# Patient Record
Sex: FEMALE | Race: WHITE | Employment: FULL TIME | URBAN - METROPOLITAN AREA
[De-identification: names, ages, dates, MRNs, and addresses within clinical notes are randomized per-mention and may not be internally consistent; named-entity substitution may affect disease eponyms.]

---

## 2020-07-20 ENCOUNTER — OFFICE VISIT (OUTPATIENT)
Dept: ORTHOPEDIC SURGERY | Age: 45
End: 2020-07-20
Payer: COMMERCIAL

## 2020-07-20 VITALS
DIASTOLIC BLOOD PRESSURE: 68 MMHG | HEART RATE: 58 BPM | BODY MASS INDEX: 20.89 KG/M2 | SYSTOLIC BLOOD PRESSURE: 104 MMHG | HEIGHT: 66 IN | WEIGHT: 130 LBS

## 2020-07-20 PROCEDURE — 99203 OFFICE O/P NEW LOW 30 MIN: CPT | Performed by: ORTHOPAEDIC SURGERY

## 2020-07-20 RX ORDER — NIFEDIPINE 10 MG/1
10 CAPSULE ORAL DAILY
COMMUNITY
Start: 2020-04-30

## 2020-07-20 NOTE — PROGRESS NOTES
[facility] ORTHOPEDICS AND SPORTS MEDICINE  [address] [facility] [address]  Dept: [phone]    Ambulatory Orthopedic Consult      CHIEF COMPLAINT:    Chief Complaint   Patient presents with    Foot Pain     LEFT FOOT/ANKLE PAIN        HISTORY OF PRESENT ILLNESS:      The patient is a 41 y.o. female who is being seen for consultation and evaluation of pain at the left lateral border of the hindfoot/midfoot greater than anterolateral ankle joint, which began in March 2020 atraumatically. The pain is described mainly with mechanical terms (dull/sharp/throbbing). The pain is worse with activity and better with rest. The patient reports a progressive course. The patient has tried:      [x]  rest/activity modification          []  NSAIDs      []  opiates      [x]  orthotics        [x]  change in shoes   [x]  home exercises  []  physical therapy      []  CAM boot     []  brace:    []  injection:       []  surgery:        REVIEW OF SYSTEMS:  Constitutional: Negative for fever. HENT: Negative for tinnitus. Eyes: Negative for pain. Respiratory: Negative for shortness of breath. Cardiovascular: Negative for chest pain. Gastrointestinal: Negative for abdominal pain. Genitourinary: Negative for dysuria. Skin: Negative for rash. Neurological: Negative for headaches. Hematological: Does not bruise/bleed easily. Musculoskeletal: See HPI for pertinent positives     Past Medical History:    She  has no past medical history on file. Past Surgical History:    She  has no past surgical history on file.      Current Medications:     Current Outpatient Medications:     NIFEdipine (PROCARDIA) 10 MG capsule, Take 10 mg by mouth daily, Disp: , Rfl:     metoprolol tartrate (LOPRESSOR) 25 MG tablet, Take 25 mg by mouth daily, Disp: , Rfl:     PARAGARD INTRAUTERINE COPPER IU, by Intrauterine route daily, Disp: , Rfl:      Allergies:    Amoxicillin    Family History:  family history is not on file. Social History:   Social History     Occupational History    Not on file   Tobacco Use    Smoking status: Never Smoker    Smokeless tobacco: Never Used   Substance and Sexual Activity    Alcohol use: Not on file    Drug use: Not on file    Sexual activity: Not on file     Occupation: [employer] (plays [employer]). She enjoys running (about 5 miles), and is a mother of 2 children. OBJECTIVE:  /68   Pulse 58   Ht 5' 6\" (1.676 m)   Wt 130 lb (59 kg)   BMI 20.98 kg/m²    Psych: alert and oriented to person, time, and place  Cardio:  well perfused extremities  Resp:  normal respiratory effort  Skin:  no cyanosis  Hem/lymph:  no lymphedema  Neuro:  sensation to light touch grossly intact throughout all nerve distributions in the foot   Musculoskeletal:    RLE:  Alignment:  Heel neutral cavus  Vascular: Toes warm and well perfused, compartments soft/compressible. No significant swelling of foot. Skin: Intact without rash/lesions/AV malformations. Strength: Able to fire/perform the following with appropriate strength:    [x]  Tib Ant:     [x]  Gastroc-Soleus:         [x]  Inversion:    [x]  Eversion:         [x]  FHL:     [x]  EHL:      Motion:  Normal for the following joints:    [x]  Ankle:      [x]  Subtalar:        [x]  1st MTP:      []  1st TMT:            Tenderness to Palpation:    Tenderness to palpation: None  -Gastroc equinus      LLE:  Alignment:  Heel neutral cavus  Vascular: Toes warm and well perfused, compartments soft/compressible. No significant swelling of foot. Skin: Intact without rash/lesions/AV malformations.   Strength: Able to fire/perform the following with appropriate strength:    [x]  Tib Ant:     [x]  Gastroc-Soleus:         [x]  Inversion:    []  Eversion: Slightly weak, mild pain       [x]  FHL:     [x]  EHL:      Motion:  Normal for the following joints:    [x]  Ankle:      [x]  Subtalar:        [x]  1st MTP:      []  1st TMT:            Tenderness to Palpation:    Tenderness to palpation: Lateral border of the foot and base of the fifth metatarsal greater than anterolateral ankle joint      RADIOLOGY:   7/20/2020 FINDINGS:  Three weightbearing views (AP, Mortise, and Lateral) of the left ankle and three weightbearing views (AP, Oblique, Lateral) of the left foot were obtained in the office today and reviewed, revealing no acute fracture, dislocation, or radioopaque foreign body/tumor. The ankle mortise is maintained with no widening of the clear spaces. Narrowed talocalcaneal angle. Meary's angle is apex dorsal.  Degenerative changes the talonavicular joint with dorsal osteophytes. IMPRESSION:  No acute fracture/dislocation. Pes Cavus. Degenerative changes at the talonavicular joint with dorsal osteophytes. Electronically signed by Reuben Burroughs MD    ASSESSMENT AND PLAN:  Cathern Blizzard was seen today for Foot Pain (LEFT FOOT/ANKLE PAIN )  The primary encounter diagnosis was Cavus deformity of foot, acquired. Diagnoses of Gastrocnemius equinus of left lower extremity and Peroneal tendinitis of left lower extremity were also pertinent to this visit. Body mass index is 20.98 kg/m². She has left lateral border foot pain secondary to a cavus foot with gastroc equinus and evidence of lateral border overload and some mild distal peroneal tendinitis; she also has some anterolateral ankle joint line pain. Notably, she has no relevant past medical history. I had a long discussion today with the patient about the likely diagnosis and its natural history, physical exam and imaging findings, as well as treatment options in detail. We discussed rest/activity modification, swelling control, NSAIDs/Acetaminophen/topical anesthetics, orthotics/shoewear modification, bracing/immobilization, injections, and physical therapy. The patient wishes to proceed with the recommendations as above.  Orders/referrals were placed as below at today's visit. I also provided information on an over the counter cavus style orthotic, including how to obtain it. I referred the patient to physical therapy for gastroc stretching. All questions were answered and the patient agrees with the above plan. The patient will return to clinic in 3 months without x-rays. Return in about 3 months (around 10/20/2020). No orders of the defined types were placed in this encounter. Orders Placed This Encounter   Procedures    Ambulatory referral to Physical Therapy     Referral Priority:   Routine     Referral Type:   Eval and Treat     Referral Reason:   Specialty Services Required     Requested Specialty:   Physical Therapy     Number of Visits Requested:   1         Babatunde Cameron MD  Orthopedic Surgery, Foot and Ankle        Please excuse any typos/errors, as this note was created with the assistance of voice recognition software. While intending to generate a document that actually reflects the content of the visit, the document can still have some errors including those of syntax and sound-a-like substitutions which may escape proof reading. In such instances, actual meaning can be extrapolated by context.

## 2020-08-03 ENCOUNTER — HOSPITAL ENCOUNTER (OUTPATIENT)
Dept: PHYSICAL THERAPY | Facility: CLINIC | Age: 45
Setting detail: THERAPIES SERIES
Discharge: HOME | End: 2020-08-03
Payer: COMMERCIAL

## 2020-08-03 PROCEDURE — 97110 THERAPEUTIC EXERCISES: CPT

## 2020-08-03 PROCEDURE — 97161 PT EVAL LOW COMPLEX 20 MIN: CPT

## 2020-08-03 NOTE — CONSULTS
[facility] [facility] Kalen Gilliland  P: [phone]  F: [phone]     Physical Therapy Lower Extremity Evaluation    Date:  8/3/2020  Patient: David Merchant  : 1975  MRN: [id number]  Mark No MD     Insurance: [facility]  Medical Diagnosis:   M21.6X9 (ICD-10-CM) - Cavus deformity of foot, acquired    M21.6X2 (ICD-10-CM) - Gastrocnemius equinus of left lower extremity      Rehab Codes: L foot pain M25.572  Onset date: 3/1/2020   Next Dr's appt.: 10/26/20    Subjective:   CC/HPI: L foot pain that began after a R ankle sprain. WALLY unknown    Pain present? yes   Location Lateral foot, plantar surface of heel   Pain Rating currently 4/10   Pain at worse 7/10   Pain at best 2/10   Description of pain Shooting aching   Altered Sensation no   What makes it worse Long walking, running, prolong standing   What makes it better sitting   Symptom progression progression   Sleep no   Work Activities/duties  Sitting job   Recreational Activities Running, cycling       PMHx: [x] Unremarkable [] Diabetes [] HTN  [] Pacemaker   [] MI/Heart Problems [] Cancer [] Arthritis   [] Other:              [x] Refer to full medical chart  In EPIC     Tests: [x] X-Ray:    2020 FINDINGS:  Three weightbearing views (AP, Mortise, and Lateral) of the left ankle and three weightbearing views (AP, Oblique, Lateral) of the left foot were obtained in the office today and reviewed, revealing no acute fracture, dislocation, or radioopaque foreign body/tumor. The ankle mortise is maintained with no widening of the clear spaces. Narrowed talocalcaneal angle. Meary's angle is apex dorsal.  Degenerative changes the talonavicular joint with dorsal osteophytes.     IMPRESSION:  No acute fracture/dislocation. Pes Cavus.  Degenerative changes at the talonavicular joint with dorsal osteophytes.     Medications:  [x] Refer to full medical record [] None [] Other:  Allergies:       [x] Refer to full medical record [] None [] Other:      OBSERVATION No Deficit Deficit Comments   Posture      Forward Head [] [x]    Rounded Shoulders [] [x]    Kyphosis [] []    Lordosis [] []    Genu Valgus [] []    Genu Varus [] []    Genu Recurvatum [] []    Cavo Varus Foot [] [x]    Neutral Foot [] []    [place] Valgus Foot [] []    Gastroc Equinus [] []    Hallux Valgus [] []    Pronation [] []    Supination [] []    Leg Length Discrp [] []     [] []        BALANCE/PROPRIOCEPTION              [] Not tested   Single leg stance       R                     L                                PAIN   Eyes open                        30Sec. 30   Sec                  . []    Eyes closed                        Sec. Sec                  . []    Stays to lateral boarder tries to keep GTE    FUNCTIONAL TESTS PAIN NO PAIN COMMENTS   Step Test 4 [] []    Bilateral Heel Raise [] [x] Rolls lateral   Single Heel Raise [x] [] Rolls lateral difficulty keeping toes down       SL SQUAT   Implications   Loss of foot contact  [x] Foot instability issue   Trunk shift  [x] Hip or foot issue   Pelvic Drop  [] Hip stability issue   Knee dives medial to second toe  [x] Hip stability issue   Hands come off hips  [] Gross instability   Loss of balance  [] Gross instability   Total:/6             Objective:    ROM  ° A/P STRENGTH    Left Right Left Right   Hip Flex       Ext       ER       IR       ABD   3+ 3   ADD       Knee Flex       Ext       Ankle PF       DF  (knee straight) -7 -18     DF   (knee flexed) 6 0     Inv   4+ 4+   Ever   5* 5   1st MTP DF 45      1st MTP PF                Flexibility Normal Left tight Right tight   Hip flexor [] [x] [x]   quad [] [] []   HS [] [] []   piriformis [] [] []   ITB [] [] []   gastroc [] [x] [x]   Soleus  [] [] []   Posterior Tib [] [x] []   Peroneals [] [] []        TTP   5th met at PB insertion, peroneus longus, talo naviculer jt, TP  and PL insertion   Callus Pattern      Sensation [] []    Edema [] []    Neurological [] []     [] []     [] []    Gait [] [] Analysis: stays on lateral aspect of foot         Foot/Ankle Mobility  Left  Right    Talocrural Jt hypo hypo    Subtalar Jt Hypo IV Hypo IV    1st MTP Jt  good good    Forefoot Hypo PF HYPO PF    Midfoot  wfl wfl        FUNCTION Normal Difficult Unable   Sitting [x] [] []   Standing [] [x] []   Ambulation [] [x] []   Groom/Dress [] [] []   Lift/Carry [] [] []   Stairs [] [x] []   Bending [] [] []   Squat [x] [] []   Kneel [x] [] []     Comments:Patient presents to physical therapy demonstrating s/s consistent with cavus foot deformity and gastroc equinus. Patient would benefit from skilled physical therapy to improve foot ROM, intrinsic and extrinsic mm strength, and improve foot mechanics to reduce pain and improve functional mobility. Assessment:      STG: (to be met in 10 treatments)  1. ? Pain: Patient to report 0/10 pain with WB   2. ? ROM:Patient to demonstrate 10 degrees L TCJ DF knee extended  3. ? Strength: Patient to demonstrate the ability to single heel raise with neutral foot positioning  4. ? Function: Patient to report 70/80 on LEFI  5. Independent with Home Exercise Programs  6. Demonstrate Knowledge of fall prevention                   Patient goals: To be able to run again several times per week    Rehab Potential:  [x] Good  [] Fair  [] Poor   Suggested Professional Referral:  [x] No  [] Yes:  Barriers to Goal Achievement[de-identified]  [x] No  [] Yes:  Domestic Concerns:  [x] No  [] Yes:    Pt. Education:  [x] Plans/Goals, Risks/Benefits discussed  [x] Home exercise program    Method of Education: [x] Verbal  [] Demo  [x] Written  Comprehension of Education:  [] Verbalizes understanding. [] Demonstrates understanding. [] Needs Review. [] Demonstrates/verbalizes understanding of HEP/Ed previously given.     Treatment Plan:  [x] Therapeutic Exercise    [] Aquatic Therapy   [x] Manual

## 2020-08-19 ENCOUNTER — HOSPITAL ENCOUNTER (OUTPATIENT)
Dept: PHYSICAL THERAPY | Facility: CLINIC | Age: 45
Setting detail: THERAPIES SERIES
Discharge: HOME | End: 2020-08-19
Payer: COMMERCIAL

## 2020-08-19 PROCEDURE — 97110 THERAPEUTIC EXERCISES: CPT

## 2020-08-19 PROCEDURE — 97140 MANUAL THERAPY 1/> REGIONS: CPT

## 2020-08-19 NOTE — FLOWSHEET NOTE
Charges: Mins Units   []  Modalities     [x]  Ther Exercise 30 2   [x]  Manual Therapy 12 1   []  Ther Activities     []  Aquatics     []  Vasocompression     []  Other     Total Treatment time 42 3       Assessment: [x] Progressing toward goals. Patient demonstrates compliance with HEP. Progressed intrinsic HEP stability to single leg standing. Patient was better able to maintain hallux in contact with floor and move through foot Sup/pro after unlocking 4th and 5th Mets. STG: (to be met in 10 treatments)  1. ? Pain: Patient to report 0/10 pain with WB   2. ? ROM:Patient to demonstrate 10 degrees L TCJ DF knee extended  3. ? Strength: Patient to demonstrate the ability to single heel raise with neutral foot positioning  4. ? Function: Patient to report 70/80 on LEFI  5. Independent with Home Exercise Programs  6. Demonstrate Knowledge of fall prevention                    Patient goals: To be able to run again several times per week     Pt. Education:  [x] Yes  [] No  [x] Reviewed Prior HEP/Ed  Method of Education: [x] Verbal  [] Demo  [] Written  Comprehension of Education:  [] Verbalizes understanding. [] Demonstrates understanding. [] Needs review. [] Demonstrates/verbalizes HEP/Ed previously given. Plan: [x] Continue current frequency toward long and short term goals.           Time In:1105          Time Out: 1202    Electronically signed by:  Cecily Harry PT

## 2020-09-03 ENCOUNTER — HOSPITAL ENCOUNTER (OUTPATIENT)
Dept: PHYSICAL THERAPY | Facility: CLINIC | Age: 45
Setting detail: THERAPIES SERIES
Discharge: HOME | End: 2020-09-03
Payer: COMMERCIAL

## 2020-09-03 PROCEDURE — 97140 MANUAL THERAPY 1/> REGIONS: CPT

## 2020-09-03 PROCEDURE — 97110 THERAPEUTIC EXERCISES: CPT

## 2020-09-03 NOTE — FLOWSHEET NOTE
Modalities     [x]  Ther Exercise 30 2   [x]  Manual Therapy 12 1   []  Ther Activities     []  Aquatics     []  Vasocompression     []  Other     Total Treatment time 42 3       Assessment: [x] Progressing toward goals. Focused treatment on improving foot mobility to neutral posture. Patient was encouraged to use LAX ball to unlock mets at home to progress ROM followed with intrinsic strengthening in new ROM     STG: (to be met in 10 treatments)  1. ? Pain: Patient to report 0/10 pain with WB   2. ? ROM:Patient to demonstrate 10 degrees L TCJ DF knee extended  3. ? Strength: Patient to demonstrate the ability to single heel raise with neutral foot positioning  4. ? Function: Patient to report 70/80 on LEFI  5. Independent with Home Exercise Programs  6. Demonstrate Knowledge of fall prevention                    Patient goals: To be able to run again several times per week     Pt. Education:  [x] Yes  [] No  [x] Reviewed Prior HEP/Ed  Method of Education: [x] Verbal  [] Demo  [] Written  Comprehension of Education:  [] Verbalizes understanding. [] Demonstrates understanding. [] Needs review. [] Demonstrates/verbalizes HEP/Ed previously given. Plan: [x] Continue current frequency toward long and short term goals.           Time In: 6pm          Time Out: 7pm    Electronically signed by:  Patricia Cowden, PT

## 2020-09-17 ENCOUNTER — HOSPITAL ENCOUNTER (OUTPATIENT)
Dept: PHYSICAL THERAPY | Facility: CLINIC | Age: 45
Setting detail: THERAPIES SERIES
Discharge: HOME | End: 2020-09-17
Payer: COMMERCIAL

## 2020-09-17 PROCEDURE — 97110 THERAPEUTIC EXERCISES: CPT

## 2020-09-17 NOTE — FLOWSHEET NOTE
[] [facility]) [facility] &  Therapy  [address]  [phone]  F: [phone] [] [address]  [place] [address]   [address]  P: [phone]  F: [phone] [x] [address]  Therapy  [address] [facility]  P: [phone]  F: [phone] [] [address]  [facility]   [address]   [place]: [phone]  F: [phone]      Physical Therapy Daily Treatment Note    Date:  2020  Patient Name:  Nishant Cancel    :  1975  MRN: [id number]   Margo Samaniego MD                           Insurance: Sary Perkins ([facility] 10 till 10/1/20)  Medical Diagnosis:   M21.6X9 (ICD-10-CM) - Cavus deformity of foot, acquired    M21.6X2 (ICD-10-CM) - Gastrocnemius equinus of left lower extremity       Rehab Codes: L foot pain M25.572  Onset date: 3/1/2020                         Next Dr's appt.: 10/26/20  Visit# / total visits: 4/6    Cancels/No Shows: 0/0    Subjective:    Pain:  [] Yes  [x] No Location: L heel Pain Rating: (0-10 scale) /10  Pain altered Tx:  [] No  [] Yes  Action:  Comments:Patient reports waking with 4/10 pain upon initial steps then pain subsides and she has maybe a 0.5/10 discomfort.      Objective:  Modalities: STR gastroc TrP 1and 2, posterior tib   Precautions:  Exercises:  Exercise       Reps/ Time Weight/ Level Completed Comments    toe yoga      SL standing   Stair step stretch       Dynamic gastroc stretch      Verbal review   Static gastroc stretch      Verbal review   MOBO wt pass   x    MOBO rocks   x    MOBO gait   x    burrito stretch 1.5x2  x gastroc and soleus   Heel raise w/ LAX x15      Sup/pro x15 blue     Peroneal step x15 blue  bilateral   EV T band x30  x    Pt edu      posture   Other:  Specific Instructions for next treatment: Review HEP given today,SL standing with band resistance psoas stretch     Treatment Charges: Mins Units   []  Modalities     [x]  Ther Exercise 35 2   [x]  Manual Therapy 15 1   []  Ther Activities     []  Aquatics     []  Vasocompression     []  Other     Total Treatment time 50 3       Assessment: [x] Progressing toward goals. Progressed intrinsic mm strength in SL standing with MOBO. VC's needed to improve hip posture while completing movements. Patient forgot to bring a change of clothes today. Will assess run mechanics to prevent LE stressors next session. 0extended  4flexed  STG: (to be met in 10 treatments)  1. ? Pain: Patient to report 0/10 pain with WB   2. ? ROM:Patient to demonstrate 10 degrees L TCJ DF knee extended  3. ? Strength: Patient to demonstrate the ability to single heel raise with neutral foot positioning  4. ? Function: Patient to report 70/80 on LEFI  5. Independent with Home Exercise Programs  6. Demonstrate Knowledge of fall prevention                    Patient goals: To be able to run again several times per week     Pt. Education:  [x] Yes  [] No  [x] Reviewed Prior HEP/Ed  Method of Education: [x] Verbal  [] Demo  [] Written  Comprehension of Education:  [] Verbalizes understanding. [] Demonstrates understanding. [] Needs review. [] Demonstrates/verbalizes HEP/Ed previously given. Plan: [x] Continue current frequency toward long and short term goals.           Time In: 6pm          Time Out: 7pm    Electronically signed by:  Mee Ahumada, PT

## 2020-10-01 ENCOUNTER — HOSPITAL ENCOUNTER (OUTPATIENT)
Dept: PHYSICAL THERAPY | Facility: CLINIC | Age: 45
Setting detail: THERAPIES SERIES
Discharge: HOME | End: 2020-10-01
Payer: COMMERCIAL

## 2020-10-01 NOTE — FLOWSHEET NOTE
[] [address]  [address]    [phone]  F: [phone]   [] [address] [facility]   [address]  P: [phone]  F: [phone]  [] [address]  [address] [facility]  P: [phone]  F: [phone]  [] [address]  [address] [address] [facility]   [address]   [place]: [phone]  F: [phone]   [] [address]  [place]: [phone]   F: [phone]     Physical Therapy Cancel/No Show note    Date: 10/1/2020  Patient: Janee Pires  : 1975  MRN: [id number]    Cancels/No Shows to date:     For today's appointment patient:    [x]  Cancelled    [] Rescheduled appointment    [] No-show     Reason given by patient:    []  Patient ill    []  Conflicting appointment    [] No transportation      [] Conflict with work    [x] No reason given    [] Weather related    [] [id number]    [] Other:      Comments:        [] Next appointment was confirmed    Electronically signed by: Chun Montilla

## 2020-10-15 ENCOUNTER — HOSPITAL ENCOUNTER (OUTPATIENT)
Dept: PHYSICAL THERAPY | Facility: CLINIC | Age: 45
Setting detail: THERAPIES SERIES
Discharge: HOME | End: 2020-10-15
Payer: COMMERCIAL

## 2020-10-15 PROCEDURE — 97112 NEUROMUSCULAR REEDUCATION: CPT

## 2020-10-15 PROCEDURE — 97140 MANUAL THERAPY 1/> REGIONS: CPT

## 2020-10-15 PROCEDURE — 97110 THERAPEUTIC EXERCISES: CPT

## 2020-10-15 NOTE — FLOWSHEET NOTE
[] [facility]) - [facility] &  Therapy  [address]  [phone]  F: [phone] [] [address]  [facility]   [address]  P: [phone]  F: [phone] [x] [address] &  Therapy  [address] [facility]  P: [phone]  F: [phone] [] [address]  [facility]   [address]   [place]: [phone]  F: [phone]      Physical Therapy Daily Treatment Note    Date:  10/15/2020  Patient Name:  Pam Purvis    :  1975  MRN: [id number]   Leatha Santacruz MD                           Insurance: [place] ([facility] [employer] 10 till 10/1/20)  Medical Diagnosis:   M21.6X9 (ICD-10-CM) - Cavus deformity of foot, acquired    M21.6X2 (ICD-10-CM) - Gastrocnemius equinus of left lower extremity       Rehab Codes: L foot pain M25.572  Onset date: 3/1/2020                         Next Dr's appt.: 10/26/20  Visit# / total visits: 4/6    Cancels/No Shows: 0/0    Subjective:    Pain:  [] Yes  [x] No Location: L heel Pain Rating: (0-10 scale) 1/10  Pain altered Tx:  [] No  [] Yes  Action:  Comments:Patient states that she had been pain free prior to Tuesday when she decided to try returning to running.  Since then she has felt 1/10 pain at rest and 3/10 pain with WB in L foot    Objective:  Modalities: STR gastroc TrP 1and 2, posterior tib, LAX MOBS   Precautions:  Exercises:  Exercise       Reps/ Time Weight/ Level Completed Comments Completed Today   Clam shells     x    toe yoga      SL standing    Stair step stretch        Dynamic gastroc stretch      Verbal review    Static gastroc stretch      Verbal review    MOBO wt pass     x   MOBO rocks     x   MOBO gait     x   burrito stretch 1.5x2   gastroc and soleus x   Heel raise w/ LAX x15       Sup/pro x15 blue      Peroneal step x15 blue  bilateral    EV T band x30       Pt edu      posture x   Other:  Specific Instructions for next treatment: Review HEP given today,modified pigeon, double and SL bridge, chair of death squats, psoas stretch, peroneal strengtening   Video Run Analysis   Warm up: 5min   Pace:   5. 9mph   Duration: 5 minutes    Shoes: Daoony glide   Marija: 152spm    Frontal plane deviations:    Contralateral pelvic drop    Increased hip adduction    Increased cross body arm swing       Sagittal plane deviations:         Elevated foot ground angle at initial contact    Limited hip ext from midstance to toe off    Knees extend over toes at midstance    Increased forward trunk lean       Recommendations:     Increase marija by 5% to 160    Land softer      Treatment Charges: Mins Units   []  Modalities     [x]  Ther Exercise 35 2   [x]  Manual Therapy 15 1   []  Ther Activities     []  Aquatics     []  Vasocompression     [x]  Other: nrm 15 1   Total Treatment time 65 4       Assessment: [x] Progressing toward goals. Initiated treatment with manual to improve gastroc length followed by intrinsic mm strengthening. Run analysis demonstated above deficiencies that can increase forces on R foot during running. Patient was able to maintain new marija well and reported feeling less LE stress. Reduced foot to ground angle with new marija     DF L  Knee extended -4 degrees    Knee flexed -5 degrees    STG: (to be met in 10 treatments)  1. ? Pain: Patient to report 0/10 pain with WB   2. ? ROM:Patient to demonstrate 10 degrees L TCJ DF knee extended  3. ? Strength: Patient to demonstrate the ability to single heel raise with neutral foot positioning  4. ? Function: Patient to report 70/80 on LEFI  5. Independent with Home Exercise Programs  6. Demonstrate Knowledge of fall prevention                    Patient goals: To be able to run again several times per week     Pt.  Education:  [x] Yes  [] No  [x] Reviewed Prior HEP/Ed  Method of Education: [x] Verbal  [] ABYF [] Written  Comprehension of Education:  [] Verbalizes understanding. [] Demonstrates understanding. [] Needs review. [] Demonstrates/verbalizes HEP/Ed previously given. Plan: [x] Continue current frequency toward long and short term goals.           Time In: 608pm          Time Out: 720pm    Electronically signed by:  Tameka Grossman PT

## 2020-10-23 ENCOUNTER — HOSPITAL ENCOUNTER (OUTPATIENT)
Dept: PHYSICAL THERAPY | Facility: CLINIC | Age: 45
Setting detail: THERAPIES SERIES
Discharge: HOME | End: 2020-10-23
Payer: COMMERCIAL

## 2020-10-23 PROCEDURE — 97140 MANUAL THERAPY 1/> REGIONS: CPT

## 2020-10-23 PROCEDURE — 97110 THERAPEUTIC EXERCISES: CPT

## 2020-10-23 NOTE — FLOWSHEET NOTE
[] [facility]) - [facility] &  Therapy  [address]  [phone]  F: [phone] [] [address]  [address]   [address]  P: [phone]  F: [phone] [x] [address]  Therapy  [address] [facility]  P: [phone]  F: [phone] [] [address]  [facility]   [address]   [place]: [phone]  F: [phone]      Physical Therapy Daily Treatment Note    Date:  10/23/2020  Patient Name:  Ирина You    :  1975  MRN: [id number]   Tip August MD                           Insurance: [place] ([facility] 10 till 10/1/20)  Medical Diagnosis:   M21.6X9 (ICD-10-CM) - Cavus deformity of foot, acquired    M21.6X2 (ICD-10-CM) - Gastrocnemius equinus of left lower extremity       Rehab Codes: L foot pain M25.572  Onset date: 3/1/2020                         Next Dr's appt.: 10/26/20  Visit# / total visits: 5/6    Cancels/No Shows: 0/0    Subjective:    Pain:  [] Yes  [x] No Location: L midfoot Pain Rating: (0-10 scale) 1/10  Pain altered Tx:  [] No  [] Yes  Action:  Comments: Patient states that she has no pain with walking or completing ADL's only minimal discomfort after running. Patient reports trying  2 trials of run walk program without a metronome and felt no pain during.     Objective:  Modalities: STR gastroc TrP 1and 2, posterior tib,  Precautions:  Exercises:  Exercise       Reps/ Time Weight/ Level Completed Comments Completed Today   Bridge erick and SL 20x5\"    x   Clam shells         toe yoga      SL standing    Stair step stretch 90+60    x   Dynamic gastroc stretch      Verbal review    Static gastroc stretch      Verbal review    MOBO wt pass        MOBO rocks        MOBO gait     x   burrito stretch 1.5x2   gastroc and soleus    Heel raise w/ LAX x15       Sup/pro x15 blue Peroneal step x15 blue  bilateral    EV Towel pull x30    x   HEP review      posture x   Other:  Specific Instructions for next treatment:   Video Run Analysis   Warm up: 5min   Pace:   5. 9mph   Duration: 5 minutes    Shoes: Saucony glide   Marija: 152spm    Frontal plane deviations:    Contralateral pelvic drop    Increased hip adduction    Increased cross body arm swing       Sagittal plane deviations:         Elevated foot ground angle at initial contact    Limited hip ext from midstance to toe off    Knees extend over toes at midstance    Increased forward trunk lean       Recommendations:     Increase marija by 5% to 160    Land softer    Strengthening exercises to improve glute dominance and neutral posture      Treatment Charges: Mins Units   []  Modalities     [x]  Ther Exercise 35 2   [x]  Manual Therapy 10 1   []  Ther Activities     []  Aquatics     []  Vasocompression     []  Other: nrm     Total Treatment time 45 3       Assessment: [x] Progressing toward goals. Reviewed all HEP VC's given to improve stabilization and eliminate compensatory ankle supination and pronation. I/S in additional exercises to improve glut activity during midstance phase of running to reduce LE foot stress. Patient was encouraged to use a metronome to ensure consistency with marija   STG: (to be met in 10 treatments) ALL ON GOING  1. ? Pain: Patient to report 0/10 pain with WB   2. ? ROM:Patient to demonstrate 10 degrees L TCJ DF knee extended  3. ? Strength: Patient to demonstrate the ability to single heel raise with neutral foot positioning  4. ? Function: Patient to report 70/80 on LEFI  5. Independent with Home Exercise Programs  6. Demonstrate Knowledge of fall prevention                    Patient goals: To be able to run again several times per week     Pt.  Education:  [x] Yes  [] No  [x] Reviewed Prior HEP/Ed  Method of Education: [x] Verbal  [] Demo  [] Written  Comprehension of Education:  [] [employer] understanding. [] Demonstrates understanding. [] Needs review. [] Demonstrates/verbalizes HEP/Ed previously given.      Plan: [x] Patient following up with physician next week        Time In: 1012am          Time Out: 11am    Electronically signed by:  Elizabeth Pryor PT

## 2020-10-26 ENCOUNTER — OFFICE VISIT (OUTPATIENT)
Dept: ORTHOPEDIC SURGERY | Age: 45
End: 2020-10-26
Payer: COMMERCIAL

## 2020-10-26 VITALS — WEIGHT: 130 LBS | HEIGHT: 66 IN | TEMPERATURE: 97.9 F | BODY MASS INDEX: 20.89 KG/M2 | RESPIRATION RATE: 12 BRPM

## 2020-10-26 PROCEDURE — 99213 OFFICE O/P EST LOW 20 MIN: CPT | Performed by: ORTHOPAEDIC SURGERY

## 2020-10-26 NOTE — PROGRESS NOTES
[facility]  [address] [facility] [address]  Dept: [phone]    Ambulatory Orthopedic Consult      CHIEF COMPLAINT:    Chief Complaint   Patient presents with    Foot Pain     Left Foot       HISTORY OF PRESENT ILLNESS:      The patient is a 41 y.o. female who is being seen for consultation and evaluation of pain at the left lateral border of the hindfoot/midfoot greater than anterolateral ankle joint, which began in March 2020 atraumatically. The pain is described mainly with mechanical terms (dull/sharp/throbbing). The pain is worse with activity and better with rest. The patient reports a progressive course. The patient has tried:      [x]  rest/activity modification          []  NSAIDs      []  opiates      [x]  orthotics        [x]  change in shoes   [x]  home exercises  []  physical therapy      []  CAM boot     []  brace:    []  injection:       []  surgery:      INTERVAL HISTORY 10/26/2020:  She is seen again today in the office for follow up of a previous issue (as above). Since being seen last, the patient is doing better. At today's visit, she is not using a brace or assistive device. The location and quality of the pain have not significantly changed since the last visit. She reports that she is about \"90% better\". REVIEW OF SYSTEMS:  Constitutional: Negative for fever. HENT: Negative for tinnitus. Eyes: Negative for pain. Respiratory: Negative for shortness of breath. Cardiovascular: Negative for chest pain. Gastrointestinal: Negative for abdominal pain. Genitourinary: Negative for dysuria. Skin: Negative for rash. Neurological: Negative for headaches. Hematological: Does not bruise/bleed easily. Musculoskeletal: See HPI for pertinent positives     Past Medical History:    She  has no past medical history on file. Past Surgical History:    She  has no past surgical history on file. Current Medications:     Current Outpatient Medications:     NIFEdipine (PROCARDIA) 10 MG capsule, Take 10 mg by mouth daily, Disp: , Rfl:     metoprolol tartrate (LOPRESSOR) 25 MG tablet, Take 25 mg by mouth daily, Disp: , Rfl:     PARAGARD INTRAUTERINE COPPER IU, by Intrauterine route daily, Disp: , Rfl:      Allergies:    Amoxicillin    Family History:  family history is not on file. Social History:   Social History     Occupational History    Not on file   Tobacco Use    Smoking status: Never Smoker    Smokeless tobacco: Never Used   Substance and Sexual Activity    Alcohol use: Not on file    Drug use: Not on file    Sexual activity: Not on file     Occupation: [employer] (plays [employer]). She enjoys running (about 5 miles), and is a mother of 2 children. OBJECTIVE:  Temp 97.9 °F (36.6 °C)   Resp 12   Ht 5' 6\" (1.676 m)   Wt 130 lb (59 kg)   BMI 20.98 kg/m²    Psych: alert and oriented to person, time, and place  Cardio:  well perfused extremities  Resp:  normal respiratory effort  Skin:  no cyanosis  Hem/lymph:  no lymphedema  Neuro:  sensation to light touch grossly intact throughout all nerve distributions in the foot   Musculoskeletal:    RLE:  Alignment:  Heel neutral cavus  Vascular: Toes warm and well perfused, compartments soft/compressible. No significant swelling of foot. Skin: Intact without rash/lesions/AV malformations. Strength: Able to fire/perform the following with appropriate strength:    [x]  Tib Ant:     [x]  Gastroc-Soleus:         [x]  Inversion:    [x]  Eversion:         [x]  FHL:     [x]  EHL:      Motion:  Normal for the following joints:    [x]  Ankle:      [x]  Subtalar:        [x]  1st MTP:      []  1st TMT:            Tenderness to Palpation:    Tenderness to palpation: None  -Gastroc equinus      LLE:  Alignment:  Heel neutral cavus  Vascular: Toes warm and well perfused, compartments soft/compressible. No significant swelling of foot.   Skin: Intact without rash/lesions/AV malformations. Strength: Able to fire/perform the following with appropriate strength:    [x]  Tib Ant:     [x]  Gastroc-Soleus:         [x]  Inversion:    []  Eversion: Slightly weak, mild pain       [x]  FHL:     [x]  EHL:      Motion:  Normal for the following joints:    [x]  Ankle:      [x]  Subtalar:        [x]  1st MTP:      []  1st TMT:            Tenderness to Palpation:    Tenderness to palpation: Lateral border of the foot and base of the fifth metatarsal --significantly improved (previously the anterolateral ankle joint line, since resolved)      RADIOLOGY:   10/26/2020 No new radiology images today. Prior images reviewed for reference. FINDINGS:  Three weightbearing views (AP, Mortise, and Lateral) of the left ankle and three weightbearing views (AP, Oblique, Lateral) of the left foot were obtained in the office today and reviewed, revealing no acute fracture, dislocation, or radioopaque foreign body/tumor. The ankle mortise is maintained with no widening of the clear spaces. Narrowed talocalcaneal angle. Meary's angle is apex dorsal.  Degenerative changes the talonavicular joint with dorsal osteophytes. IMPRESSION:  No acute fracture/dislocation. Pes Cavus. Degenerative changes at the talonavicular joint with dorsal osteophytes. Electronically signed by Guillermo Joshi MD    ASSESSMENT AND PLAN:  Per Loza was seen today for Foot Pain (Left Foot)  The primary encounter diagnosis was Cavus deformity of foot, acquired. Diagnoses of Gastrocnemius equinus of left lower extremity and Peroneal tendinitis of left lower extremity were also pertinent to this visit. Body mass index is 20.98 kg/m². She has left lateral border foot pain secondary to a cavus foot with gastroc equinus and evidence of lateral border overload and some mild distal peroneal tendinitis; she also has a history of anterolateral ankle joint line pain.      Notably, she has no relevant past